# Patient Record
Sex: FEMALE | URBAN - METROPOLITAN AREA
[De-identification: names, ages, dates, MRNs, and addresses within clinical notes are randomized per-mention and may not be internally consistent; named-entity substitution may affect disease eponyms.]

---

## 2020-07-29 ENCOUNTER — OFFICE VISIT (OUTPATIENT)
Dept: URBAN - METROPOLITAN AREA CLINIC 17 | Facility: CLINIC | Age: 25
End: 2020-07-29
Payer: COMMERCIAL

## 2020-07-29 DIAGNOSIS — E10.9 TYPE 1 DIABETES MELLITUS W/O COMPLICATION: Primary | ICD-10-CM

## 2020-07-29 DIAGNOSIS — Q14.1 CONGENITAL MALFORMATION OF RETINA: ICD-10-CM

## 2020-07-29 DIAGNOSIS — H53.143 BILATERAL PHOTOPHOBIA: ICD-10-CM

## 2020-07-29 PROCEDURE — 99203 OFFICE O/P NEW LOW 30 MIN: CPT | Performed by: OPTOMETRIST

## 2020-07-29 ASSESSMENT — INTRAOCULAR PRESSURE
OD: 20
OS: 19

## 2020-07-29 NOTE — IMPRESSION/PLAN
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 CH[facility]PE Plan: Discussed diagnosis in detail with patient. No treatment is required at this time. Will continue to observe condition and or symptoms.

## 2020-07-29 NOTE — IMPRESSION/PLAN
Impression: Type 1 diabetes mellitus w/o complication: X00.6. Plan: Diabetes type I: no background retinopathy, no signs of neovascularization noted. Discussed ocular and systemic benefits of blood sugar control.

## 2020-07-29 NOTE — IMPRESSION/PLAN
Impression: Bilateral photophobia: H53.143. Plan: Discussed diagnosis in detail with patient. Recommend wearing glasses outdoors. Will continue to observe condition and or symptoms.

## 2022-07-12 ENCOUNTER — OFFICE VISIT (OUTPATIENT)
Dept: URBAN - METROPOLITAN AREA CLINIC 17 | Facility: CLINIC | Age: 27
End: 2022-07-12
Payer: COMMERCIAL

## 2022-07-12 DIAGNOSIS — Q14.1 CONGENITAL MALFORMATION OF RETINA: ICD-10-CM

## 2022-07-12 DIAGNOSIS — H53.143 BILATERAL PHOTOPHOBIA: ICD-10-CM

## 2022-07-12 DIAGNOSIS — E10.9 TYPE 1 DIABETES MELLITUS W/O COMPLICATION: Primary | ICD-10-CM

## 2022-07-12 PROCEDURE — 99214 OFFICE O/P EST MOD 30 MIN: CPT | Performed by: OPTOMETRIST

## 2022-07-12 ASSESSMENT — INTRAOCULAR PRESSURE
OD: 18
OS: 16

## 2022-07-12 NOTE — IMPRESSION/PLAN
Impression: Type 1 diabetes mellitus w/o complication: S10.8. Plan: Diabetes type I: no background retinopathy, no signs of neovascularization noted. Discussed ocular and systemic benefits of blood sugar control.

## 2022-08-05 ENCOUNTER — OFFICE VISIT (OUTPATIENT)
Dept: URBAN - NONMETROPOLITAN AREA CLINIC 6 | Facility: CLINIC | Age: 27
End: 2022-08-05
Payer: COMMERCIAL

## 2022-08-05 DIAGNOSIS — E11.9 DIABETES MELLITUS TYPE 2 WITHOUT MENTION OF COMPLICATION: ICD-10-CM

## 2022-08-05 DIAGNOSIS — Q14.1 CONGENITAL MALFORMATION OF RETINA: ICD-10-CM

## 2022-08-05 DIAGNOSIS — H52.223 REGULAR ASTIGMATISM, BILATERAL: Primary | ICD-10-CM

## 2022-08-05 DIAGNOSIS — Z79.4 LONG TERM CURRENT USE OF INSULIN: ICD-10-CM

## 2022-08-05 PROCEDURE — 92004 COMPRE OPH EXAM NEW PT 1/>: CPT | Performed by: OPTOMETRIST

## 2022-08-05 ASSESSMENT — VISUAL ACUITY
OD: 20/25
OS: 20/30

## 2022-08-05 ASSESSMENT — INTRAOCULAR PRESSURE
OD: 16
OS: 15

## 2022-08-05 NOTE — IMPRESSION/PLAN
Impression: Diabetes mellitus Type 2 without mention of complication: A65.1. Plan: Diabetes insulin: no non-proliferative diabetic retinopathy, no signs of neovascularization noted. Discussed ocular and systemic benefits of blood sugar control. Continue management under PCP.

## 2024-01-08 ENCOUNTER — OFFICE VISIT (OUTPATIENT)
Dept: URBAN - NONMETROPOLITAN AREA CLINIC 6 | Facility: CLINIC | Age: 29
End: 2024-01-08
Payer: COMMERCIAL

## 2024-01-08 DIAGNOSIS — E11.9 DIABETES MELLITUS TYPE 2 WITHOUT MENTION OF COMPLICATION: ICD-10-CM

## 2024-01-08 DIAGNOSIS — H52.13 MYOPIA, BILATERAL: Primary | ICD-10-CM

## 2024-01-08 PROCEDURE — 92014 COMPRE OPH EXAM EST PT 1/>: CPT | Performed by: OPTOMETRIST

## 2024-01-08 ASSESSMENT — VISUAL ACUITY
OD: 20/20
OS: 20/20

## 2024-01-08 ASSESSMENT — INTRAOCULAR PRESSURE
OD: 16
OS: 18

## 2024-01-08 ASSESSMENT — KERATOMETRY
OS: 42.16
OD: 42.20